# Patient Record
Sex: FEMALE | Race: WHITE
[De-identification: names, ages, dates, MRNs, and addresses within clinical notes are randomized per-mention and may not be internally consistent; named-entity substitution may affect disease eponyms.]

---

## 2018-04-16 ENCOUNTER — HOSPITAL ENCOUNTER (OUTPATIENT)
Dept: HOSPITAL 74 - FASU | Age: 53
LOS: 1 days | Discharge: HOME | End: 2018-04-17
Attending: PLASTIC SURGERY
Payer: COMMERCIAL

## 2018-04-16 VITALS — BODY MASS INDEX: 27.8 KG/M2

## 2018-04-16 DIAGNOSIS — N65.0: Primary | ICD-10-CM

## 2018-04-16 DIAGNOSIS — N65.1: ICD-10-CM

## 2018-04-16 DIAGNOSIS — C50.911: ICD-10-CM

## 2018-04-16 DIAGNOSIS — K43.9: ICD-10-CM

## 2018-04-16 DIAGNOSIS — M95.8: ICD-10-CM

## 2018-04-16 PROCEDURE — 0JD83ZZ EXTRACTION OF ABDOMEN SUBCUTANEOUS TISSUE AND FASCIA, PERCUTANEOUS APPROACH: ICD-10-PCS | Performed by: PLASTIC SURGERY

## 2018-04-16 PROCEDURE — 0JX80ZB TRANSFER ABDOMEN SUBCUTANEOUS TISSUE AND FASCIA WITH SKIN AND SUBCUTANEOUS TISSUE, OPEN APPROACH: ICD-10-PCS | Performed by: PLASTIC SURGERY

## 2018-04-16 PROCEDURE — 0HRT076 REPLACEMENT OF RIGHT BREAST USING TRANSVERSE RECTUS ABDOMINIS MYOCUTANEOUS FLAP, OPEN APPROACH: ICD-10-PCS | Performed by: PLASTIC SURGERY

## 2018-04-16 PROCEDURE — 0WQF0ZZ REPAIR ABDOMINAL WALL, OPEN APPROACH: ICD-10-PCS | Performed by: PLASTIC SURGERY

## 2018-04-16 RX ADMIN — CEFAZOLIN SODIUM SCH MLS/HR: 1 INJECTION, SOLUTION INTRAVENOUS at 18:00

## 2018-04-16 RX ADMIN — ACETAMINOPHEN PRN MG: 325 TABLET ORAL at 20:14

## 2018-04-16 RX ADMIN — HYDROMORPHONE HYDROCHLORIDE SCH MG: 10 INJECTION, SOLUTION INTRAMUSCULAR; INTRAVENOUS; SUBCUTANEOUS at 15:27

## 2018-04-16 RX ADMIN — CEFAZOLIN SODIUM SCH MLS/HR: 1 INJECTION, SOLUTION INTRAVENOUS at 23:00

## 2018-04-16 RX ADMIN — HYDROMORPHONE HYDROCHLORIDE SCH MG: 10 INJECTION, SOLUTION INTRAMUSCULAR; INTRAVENOUS; SUBCUTANEOUS at 14:00

## 2018-04-16 NOTE — OP
Operative Note





- Note:


Operative Date: 04/16/18


Pre-Operative Diagnosis: Left sided breast cancer with bilateral mastectomy and 

DEIP flap recontructions, Abdominal hernia


Operation: Right breast revision of reconstruction, revision of abdominal hernia

, liposuction to flanks


Post-Operative Diagnosis: Same as Pre-op


Surgeon: Goldberg,Neal D


Assistant: Jaquelin Leos


Anesthesiologist/CRNA: Humberto Rodriguez


Anesthesia: General


Specimens Removed: right breast tissue and  Abdominal tissue


Estimated Blood Loss (mls): 100


Drains & Tubes with Location: CLOTILDE drains x2 at right and left abdomen.


Drains, Volume Out (mls): 50 (lyons)


Fluid Volume Replaced (mls): 1,000


Operative Report Dictated: Yes

## 2018-04-16 NOTE — SURG
Surgery First Assist Note


First Assist: Jaquelin Leos PA-C


Date of Service: 04/16/18


Diagnosis: 





Left sided breast cancer with bilateral mastectomy and DEIP flap recontructions

, Abdominal hernia


Procedure: 





Revision of right breast reconstruction, Revision of abdominal hernia and 

liposuction to flanks


I was present for the entirety of the operative procedure. For further detail, 

please refer to operative report.








Visit type





- Case Type


Case Type: Scheduled Admission





- Emergency


Emergency Visit: No





- New patient


This patient is new to me today: Yes


Date on this admission: 04/16/18

## 2018-04-16 NOTE — OP
Operative Note





- Note:


Operative Date: 04/16/18


Pre-Operative Diagnosis: left breast cancer with abdominal pseudohernia of 

donor site and disproportion of right reconstucted breast


Operation: repair of abdominal donor site pseudohernia with revision of right 

reconstructed breast


Findings: 





in tact prolene mesh on abdomen


Post-Operative Diagnosis: Same as Pre-op


Surgeon: Goldberg,Neal D


Assistant: Jaquelin Leos


Drains & Tubes with Location: CLOTILDE x 2 abdomen


Operative Report Dictated: Yes

## 2018-04-16 NOTE — HP
History & Physical Update





- History


History: No Change





- Physical


Physical: No Change





- Assessment


Assessment: No Change





- Plan


Plan: No Change (no interval changes since visit on 4/10/18.)

## 2018-04-16 NOTE — OP
DATE OF OPERATION:  04/16/2018

 

TITLE OF PROCEDURE: 

1.  Right-sided revision of reconstructed transverse rectus abdominis myocutaneous

(TRAM) flap of breast.

2.  Repair of abdominal donor site pseudohernia deformity at site of free transverse

rectus abdominis myocutaneous repair with Prolene mesh.

3.  Bilateral flank liposuction.

4.  Bilateral revision of abdominal donor site closure by advancement of local

tissues.

 

PREOPERATIVE DIAGNOSIS:  Deformity and disproportion of bilateral reconstructed

breasts, left-sided breast cancer status post left-sided deep inferior epigastric

 artery (MARKY) flap breast reconstruction and right-sided free transverse

rectus abdominis myocutaneous (TRAM) flap reconstruction of breast with deformity and

disproportion and pseudohernia deformity of abdominal donor site from left-sided free

transverse rectus abdominis myocutaneous (TRAM) and mesh abdominal wall

reconstruction.

 

ATTENDING SURGEON:  Neal D. Goldberg, MD

 

FIRST ASSISTANT:  SARIKA Stanford

 

The patient is marked in the holding area, awake and aware of all incisions and

resulting scars.  All risks, benefits, and alternatives to the procedure are

discussed with the patient, understood.  Patient is given 5000 units of subcutaneous

heparin preoperatively.  Sequential compression stockings and LYLE hose are applied

preoperatively.

 

She is then given 2 g of Ancef preoperatively, brought to the operating room, placed

in supine position.  Position is carefully checked by surgical and anesthesia teams. 

After induction of general anesthesia, all proper padding is applied.  Bateman catheter

is placed, which was removed at the end of the procedure.  The patient is then

prepped and draped in standard surgical fashion.  A timeout is called.  Patient,

procedure, site, and sides are verified.  At this point, patient is brought to a

seated upright position where tailor tacking of the breast to assess for the optimal

skin excision is made.  Markings are confirmed, and incision is made at the site of

the existing right-sided mastectomy scar including an inferior limb of the incision

which is roughly 2 cm cephalad to the existing inframammary fold.  Medially, the

excision is skin, subcutaneous fat, and as the excision is tapered laterally,

elements of the right-sided TRAM flap are excised to contour the breast to remove

lateral fullness.  The mastectomy flaps are re-elevated superiorly and inferiorly

several centimeters for a distorted closure.  Hemostasis then meticulously achieved. 

The fibrous tissue of the flap is repaired to itself with a series of interrupted,

buried, 2-0 Vicryl sutures.  The skin of the mastectomy flaps is then closed with a

series of interrupted, buried, deep dermal, 3-0 Monocryl suture, followed by a

running subcuticular 3-0 Monocryl suture.  With the patient brought to a seated

upright position, there is excellent symmetry of size and shape noted.

 

Attention is then directed toward the abdomen.  An incision is made at the existing

scar of the abdominal donor site and dissection carried down to the level of the

abdominal wall fascia.  Immediately, dissection along the abdominal wall fascia

encounters an empty seroma cavity on the patient's right and a well-incorporated

Prolene mesh on the left.  The dissection is able to be maintained superficial to the

mesh to the level of the umbilicus.  The umbilicus is then circumcised and dissected

on a wide fibrofatty stalk down to level of the abdominal wall fascia.  Dissection is

then carried along the abdominal wall fascia to the xiphoid process and the midline

costal margins bilaterally.  Hemostasis is meticulously achieved.  The area of the

mesh abdominal fascia repair is intact.  However, there is attenuation and severe

bulging inferior to the umbilicus _____ out to a hernia, although no discrete hernia

is identified.  The strongest elements of the fascia are identified, and these are

repaired to one another with a series of interrupted, buried, figure-of-eight 1

Prolene suture, both inferior and superior to the umbilicus.  A second layer of

repair is performed with a running, locking 1 Prolene suture overlying the first

repair.  The endpoint is a flat, even contour to the abdomen.  Wide berth is given

for the umbilicus for translocation.  With the patient brought to a 20-degree flexed

position, it is determined that skin in between the previous umbilicus defect and the

existing donor site scar is able to be excised without tension.  Additionally, 2 cm

of skin on the inferior pubic side of the wound are able to be excised.  Hemostasis

is meticulously achieved.  The deep surface of the abdominal donor site flap is

defatted of subscarpal fat.  Hemostasis is again achieved.  The lateral extents of

the abdominal donor site scar have distortion with bulging dog ears.  These are

marked for excision and advancement of the abdominal flap medially to correct for

these deformities.  Prior to further progress, both flanks are infiltrated with

standard wetting solution of 1 L of normal saline, 1 ampule of 1:1000 epinephrine,

and 20 mL of 1% lidocaine plain.  A total of 1200 mL is used, 600 mL in either flank.

 While this is being used, a new site for the umbilicus is marked.  An inverted Star

Trek pattern is used, and a 6 o'clock notch is removed from the umbilicus.  The

umbilicus is translocated through the abdominoplasty flap and secured with a series

of interrupted, buried, deep dermal, 3-0 Monocryl suture, followed by a series of

interrupted 4-0 chromic gut suture with 4-0 nylon suture at the key points.  A size

10 flat Angel drain is brought out through the abdominal scar.  The right-sided drain

is in the superior recess of the wound; the left-sided drain is in the inferior

recess of the wound.  Closure is then performed with a series of interrupted,

superficial fascial system, Irina layer 2-0 Vicryl suture buried.  At this point,

liposuction is then performed with a power-assisted MicroAire 4-mm cannula.  A total

of 450 mL of liposuction aspirate are yielded from either flank on the lower abdomen.

 The infraumbilical suprapubic skin is pink and viable at the end of this portion of

the procedure.  The final closure of the abdomen is then performed with a series of

interrupted, buried, deep dermal, 3-0 Monocryl suture, followed by a running

subcuticular 3-0 V-Loc suture in the mid-dermis.  Patient tolerated the procedure

well.  An abdominal binder is applied, and a breast bra is applied.  She is awoken

from anesthesia, drain is placed to bulb suction, and she is transferred to Recovery

without complication.

 

 

NEAL GOLDBERG, M.D. [id number]

DD: 04/16/2018 13:36

DT: 04/16/2018 19:04

Job #:  [id number]

## 2018-04-17 VITALS — TEMPERATURE: 98.3 F | DIASTOLIC BLOOD PRESSURE: 65 MMHG | SYSTOLIC BLOOD PRESSURE: 121 MMHG | HEART RATE: 94 BPM

## 2018-04-17 RX ADMIN — CEFAZOLIN SODIUM SCH MLS/HR: 1 INJECTION, SOLUTION INTRAVENOUS at 06:33

## 2018-04-17 RX ADMIN — ACETAMINOPHEN PRN MG: 325 TABLET ORAL at 02:49

## 2018-04-17 NOTE — PN
Progress Note (short form)





- Note


Progress Note: 





All tissues viable, no bleed or collections


Ambulating, Using IS


VSS AF


OK for discharge today

## 2018-04-18 NOTE — PATH
Surgical Pathology Report



Patient Name:  VIANEY JAY

Accession #:  

[facility]. Rec. #:  [id number]                                                        

   /Age/Gender:  1965 (Age: 52) / F

Account:  [id number]                                                          

             Location: [facility] 

Taken:  2018

Received:  2018

Reported:  2018

Physicians:  Neal David Goldberg

  



Specimen(s) Received

 RIGHT BREAST FLAP 





Clinical History

Breast cancer







Final Diagnosis

FLAP, RIGHT BREAST, REVISION:

FIBROADIPOSE TISSUE SHOWING PRIOR BIOPSY SITE CHANGES.

SKIN WITH NO PATHOLOGIC FINDINGS.





***Electronically Signed***

Jaquelin Casas M.D.





Gross Description

Received in formalin labeled "right breast flap," is a 23.0 x 4.0 cm in tan,

elliptical portion of skin with underlying soft tissue measuring up to 3.5 cm in

thickness. The epidermal surface is unremarkable. Sectioning reveals foci of

white fibrous tissue. Representative sections are submitted in 3 cassettes.

/2018



saudi/[email or web address]